# Patient Record
Sex: FEMALE | Race: WHITE
[De-identification: names, ages, dates, MRNs, and addresses within clinical notes are randomized per-mention and may not be internally consistent; named-entity substitution may affect disease eponyms.]

---

## 2017-07-18 ENCOUNTER — HOSPITAL ENCOUNTER (OUTPATIENT)
Dept: HOSPITAL 19 - MC.RAD | Age: 52
End: 2017-07-18
Attending: OBSTETRICS & GYNECOLOGY
Payer: COMMERCIAL

## 2017-07-18 DIAGNOSIS — Z12.31: Primary | ICD-10-CM

## 2018-07-19 ENCOUNTER — HOSPITAL ENCOUNTER (OUTPATIENT)
Dept: HOSPITAL 19 - MC.RAD | Age: 53
End: 2018-07-19
Attending: OBSTETRICS & GYNECOLOGY
Payer: COMMERCIAL

## 2018-07-19 DIAGNOSIS — Z12.31: Primary | ICD-10-CM

## 2019-04-05 ENCOUNTER — HOSPITAL ENCOUNTER (OUTPATIENT)
Dept: HOSPITAL 19 - COL.RAD | Age: 54
End: 2019-04-05
Attending: FAMILY MEDICINE
Payer: COMMERCIAL

## 2019-04-05 DIAGNOSIS — R31.29: Primary | ICD-10-CM

## 2019-04-05 DIAGNOSIS — Z90.49: ICD-10-CM

## 2019-04-05 DIAGNOSIS — R10.9: ICD-10-CM

## 2019-04-05 DIAGNOSIS — Z90.710: ICD-10-CM

## 2019-07-22 ENCOUNTER — HOSPITAL ENCOUNTER (OUTPATIENT)
Dept: HOSPITAL 19 - MC.RAD | Age: 54
End: 2019-07-22
Payer: COMMERCIAL

## 2019-07-22 DIAGNOSIS — Z12.31: Primary | ICD-10-CM

## 2020-07-23 ENCOUNTER — HOSPITAL ENCOUNTER (OUTPATIENT)
Dept: HOSPITAL 19 - MC.RAD | Age: 55
End: 2020-07-23
Attending: OBSTETRICS & GYNECOLOGY
Payer: COMMERCIAL

## 2020-07-23 DIAGNOSIS — Z12.31: Primary | ICD-10-CM

## 2021-07-26 ENCOUNTER — HOSPITAL ENCOUNTER (OUTPATIENT)
Dept: HOSPITAL 19 - MC.RAD | Age: 56
End: 2021-07-26
Attending: FAMILY MEDICINE
Payer: COMMERCIAL

## 2021-07-26 DIAGNOSIS — Z12.31: Primary | ICD-10-CM

## 2022-07-27 ENCOUNTER — HOSPITAL ENCOUNTER (OUTPATIENT)
Dept: HOSPITAL 19 - MC.RAD | Age: 57
End: 2022-07-27
Attending: FAMILY MEDICINE
Payer: COMMERCIAL

## 2022-07-27 DIAGNOSIS — Z12.31: Primary | ICD-10-CM

## 2024-09-03 ENCOUNTER — HOSPITAL ENCOUNTER (OUTPATIENT)
Dept: HOSPITAL 19 - MC.RAD | Age: 59
End: 2024-09-03
Attending: FAMILY MEDICINE
Payer: COMMERCIAL

## 2024-09-03 DIAGNOSIS — Z12.31: Primary | ICD-10-CM

## 2024-10-10 ENCOUNTER — HOSPITAL ENCOUNTER (OUTPATIENT)
Dept: HOSPITAL 19 - COL.CAR | Age: 59
LOS: 1 days | Discharge: HOME | End: 2024-10-11
Attending: INTERNAL MEDICINE
Payer: COMMERCIAL

## 2024-10-10 VITALS — WEIGHT: 147.71 LBS | HEIGHT: 62.01 IN | BODY MASS INDEX: 26.84 KG/M2

## 2024-10-10 VITALS — HEART RATE: 65 BPM | DIASTOLIC BLOOD PRESSURE: 51 MMHG | TEMPERATURE: 97.6 F | SYSTOLIC BLOOD PRESSURE: 115 MMHG

## 2024-10-10 VITALS — SYSTOLIC BLOOD PRESSURE: 112 MMHG | HEART RATE: 79 BPM | DIASTOLIC BLOOD PRESSURE: 75 MMHG | TEMPERATURE: 97.8 F

## 2024-10-10 VITALS — HEART RATE: 71 BPM | SYSTOLIC BLOOD PRESSURE: 109 MMHG | DIASTOLIC BLOOD PRESSURE: 72 MMHG

## 2024-10-10 VITALS — HEART RATE: 62 BPM | SYSTOLIC BLOOD PRESSURE: 119 MMHG | TEMPERATURE: 97.8 F | DIASTOLIC BLOOD PRESSURE: 80 MMHG

## 2024-10-10 VITALS — TEMPERATURE: 97.6 F | SYSTOLIC BLOOD PRESSURE: 106 MMHG | HEART RATE: 71 BPM | DIASTOLIC BLOOD PRESSURE: 72 MMHG

## 2024-10-10 VITALS — SYSTOLIC BLOOD PRESSURE: 109 MMHG

## 2024-10-10 VITALS — DIASTOLIC BLOOD PRESSURE: 75 MMHG | SYSTOLIC BLOOD PRESSURE: 112 MMHG | HEART RATE: 70 BPM | TEMPERATURE: 97.6 F

## 2024-10-10 VITALS — DIASTOLIC BLOOD PRESSURE: 68 MMHG | SYSTOLIC BLOOD PRESSURE: 127 MMHG | TEMPERATURE: 97.5 F | HEART RATE: 74 BPM

## 2024-10-10 VITALS — SYSTOLIC BLOOD PRESSURE: 127 MMHG

## 2024-10-10 VITALS — DIASTOLIC BLOOD PRESSURE: 76 MMHG | SYSTOLIC BLOOD PRESSURE: 112 MMHG | HEART RATE: 78 BPM

## 2024-10-10 VITALS — DIASTOLIC BLOOD PRESSURE: 81 MMHG | HEART RATE: 62 BPM | SYSTOLIC BLOOD PRESSURE: 119 MMHG | TEMPERATURE: 97.8 F

## 2024-10-10 VITALS — TEMPERATURE: 98.5 F | SYSTOLIC BLOOD PRESSURE: 129 MMHG | DIASTOLIC BLOOD PRESSURE: 73 MMHG | HEART RATE: 78 BPM

## 2024-10-10 VITALS — DIASTOLIC BLOOD PRESSURE: 65 MMHG | TEMPERATURE: 97.6 F | SYSTOLIC BLOOD PRESSURE: 111 MMHG | HEART RATE: 71 BPM

## 2024-10-10 VITALS — SYSTOLIC BLOOD PRESSURE: 115 MMHG

## 2024-10-10 DIAGNOSIS — Z63.79: ICD-10-CM

## 2024-10-10 DIAGNOSIS — I49.3: ICD-10-CM

## 2024-10-10 DIAGNOSIS — I45.5: Primary | ICD-10-CM

## 2024-10-10 DIAGNOSIS — R00.2: ICD-10-CM

## 2024-10-10 LAB
ANION GAP SERPL CALC-SCNC: 9 MMOL/L (ref 7–16)
BUN SERPL-MCNC: 14 MG/DL (ref 10–20)
CALCIUM SERPL-MCNC: 9.3 MG/DL (ref 8.4–10.2)
CHLORIDE SERPL-SCNC: 109 MEQ/L (ref 98–107)
CREAT SERPL-SCNC: 0.81 MG/DL (ref 0.57–1.11)
ERYTHROCYTE [DISTWIDTH] IN BLOOD BY AUTOMATED COUNT: 13.1 % (ref 11.5–14.5)
GLUCOSE SERPL-MCNC: 91 MG/DL (ref 70–99)
HCT VFR BLD AUTO: 38.1 % (ref 37–47)
HGB BLD-MCNC: 13 G/DL (ref 12.5–16)
INR BLD: 0.9 (ref 0.8–3)
MCH RBC QN AUTO: 30 PG (ref 27–31)
MCHC RBC AUTO-ENTMCNC: 34 G/DL (ref 33–37)
MCV RBC AUTO: 89 FL (ref 80–100)
PLATELET # BLD AUTO: 333 K/MM3 (ref 130–400)
PMV BLD AUTO: 8.9 FL (ref 7.4–10.4)
POTASSIUM SERPL-SCNC: 4.3 MEQ/L (ref 3.5–4.5)
PROTHROMBIN TIME: 10.3 SECONDS (ref 9.7–12.8)
RBC # BLD AUTO: 4.29 M/MM3 (ref 4.1–5.3)
SODIUM SERPL-SCNC: 140 MEQ/L (ref 136–145)

## 2024-10-10 PROCEDURE — C1785 PMKR, DUAL, RATE-RESP: HCPCS

## 2024-10-10 PROCEDURE — C1894 INTRO/SHEATH, NON-LASER: HCPCS

## 2024-10-10 PROCEDURE — C1898 LEAD, PMKR, OTHER THAN TRANS: HCPCS

## 2024-10-10 NOTE — NUR
Patient assessed around 2115. Alert and oriented, and able to make needs
known. Complained of level 3 pain to pacemaker site. Given PRN Tylenol.
Peripheral INT to left wrist. Denies SOB and dyspnea. LS CTA. HRR. Dressing to
pacemaker site is CDI. Telemetry in place. BSAx4. No edema. Sling to left arm.
Ice pack to pacemaker site. Voices no questions, needs, or concerns at this
time. In bed with call light within reach.

## 2024-10-10 NOTE — NUR
Patient refused Verapmil since she does not take medication at home. States
she would like to talk to Dr. Morales prior to changing home medications.

## 2024-10-10 NOTE — NUR
Patient arrived to the medical unit, alert and oriented x 4, VSS. States some
pressure in the site. Scarce drainage at the bottom of dressing. Swing on left
arm. Post op VS started.

## 2024-10-11 VITALS — SYSTOLIC BLOOD PRESSURE: 142 MMHG | TEMPERATURE: 97.7 F | DIASTOLIC BLOOD PRESSURE: 75 MMHG | HEART RATE: 70 BPM

## 2024-10-11 VITALS — TEMPERATURE: 98.4 F | SYSTOLIC BLOOD PRESSURE: 147 MMHG | HEART RATE: 77 BPM | DIASTOLIC BLOOD PRESSURE: 87 MMHG

## 2024-10-11 VITALS — SYSTOLIC BLOOD PRESSURE: 126 MMHG

## 2024-10-11 VITALS — HEART RATE: 61 BPM | DIASTOLIC BLOOD PRESSURE: 62 MMHG | SYSTOLIC BLOOD PRESSURE: 126 MMHG | TEMPERATURE: 97.9 F

## 2024-10-11 VITALS — SYSTOLIC BLOOD PRESSURE: 147 MMHG

## 2024-10-11 VITALS — SYSTOLIC BLOOD PRESSURE: 142 MMHG

## 2024-10-11 LAB
ANION GAP SERPL CALC-SCNC: 9 MMOL/L (ref 7–16)
BASOPHILS # BLD: 0.1 K/MM3 (ref 0–0.2)
BASOPHILS NFR BLD AUTO: 0.4 % (ref 0–2)
BUN SERPL-MCNC: 13 MG/DL (ref 10–20)
CALCIUM SERPL-MCNC: 8.6 MG/DL (ref 8.4–10.2)
CHLORIDE SERPL-SCNC: 106 MEQ/L (ref 98–107)
CREAT SERPL-SCNC: 0.81 MG/DL (ref 0.57–1.11)
EOSINOPHIL # BLD: 0.2 K/MM3 (ref 0–0.7)
EOSINOPHIL NFR BLD: 1.5 % (ref 0–4)
ERYTHROCYTE [DISTWIDTH] IN BLOOD BY AUTOMATED COUNT: 13.3 % (ref 11.5–14.5)
GLUCOSE SERPL-MCNC: 89 MG/DL (ref 70–99)
GRANULOCYTES # BLD AUTO: 65.5 % (ref 42.2–75.2)
HCT VFR BLD AUTO: 34.3 % (ref 37–47)
HGB BLD-MCNC: 11.7 G/DL (ref 12.5–16)
LYMPHOCYTES # BLD: 3.5 K/MM3 (ref 1.2–3.4)
LYMPHOCYTES NFR BLD: 25.9 % (ref 20–51)
MCH RBC QN AUTO: 31 PG (ref 27–31)
MCHC RBC AUTO-ENTMCNC: 34 G/DL (ref 33–37)
MCV RBC AUTO: 90 FL (ref 80–100)
MONOCYTES # BLD: 0.9 K/MM3 (ref 0.1–0.6)
MONOCYTES NFR BLD AUTO: 6.3 % (ref 1.7–9.3)
NEUTROPHILS # BLD: 8.9 K/MM3 (ref 1.4–6.5)
PLATELET # BLD AUTO: 234 K/MM3 (ref 130–400)
PMV BLD AUTO: 10.4 FL (ref 7.4–10.4)
POTASSIUM SERPL-SCNC: 3.9 MEQ/L (ref 3.5–4.5)
RBC # BLD AUTO: 3.82 M/MM3 (ref 4.1–5.3)
SODIUM SERPL-SCNC: 137 MEQ/L (ref 136–145)

## 2024-10-11 NOTE — NUR
IV CATHETER REMOVED FROM LEFT HAND. CATHETER TIP INTACT, PRESSURE HELD UNTIL
BLEEDING STOPPED. MARCELLO AND TAPE DRESSING PLACED. PATIENT TOLERATED PROCEDURE
WELL.

## 2024-10-11 NOTE — NUR
met with pt to discuss intake information. She reports to live
with her , Cuco [phone] and other family members in [place]. Pt
sees Dr. Garcia for PCP needs and obtains medications from [facility] with no
difficulties. She confirmed to have [employer] insurance. Pt is independent with
[facility] and uses no DME. She has no concerns for SW.
 
Discharge Plan: home

## 2024-10-11 NOTE — NUR
Initial visit; Patient thanked  for looking in on her and saying
hello. She is being discharged today and is glad to know there is Spiritual
Care available at [facility].

## 2024-10-11 NOTE — NUR
PATIENT STATES THAT THEY ARE READY FOR DISCHARGE. PRIMARY NURSE GAVE DICHARGE
INSTRUCTIONS TO PATIENT. DICHARGE EDUCATION GIVEN RELATED TO NEW ANTIBIOTIC,
PACEMAKER INFORMATION, AND IMPORTANCE TO KEEPING SHOULDER IN NEUTRAL POSTION
TO MAINTAIN LEAD PLACEMENT. CURENTLY PATIENT IS GETTING DRESSED WITH THE
ASSISTANCE OF ANOTHER STUDENT NURSE. PATIENT HAS BEEN CALLED AND HE IS
CURRENTLY ON HIS WAY TO PICK PATIENT UP.

## 2024-10-11 NOTE — NUR
Patient is resting in bed, alert and oriented x 4, VSS. Denies any pain, jus
some pressure on the site. Assessment completed. Student nurse to [facility]
morning meds. Pacemaker was download. No further needs at this time. Call
light within reach.

## 2024-10-11 NOTE — NUR
PATIENT IN BED RESTING WITH HEAD OF BED ELEVATED 30 DEGREES. COMPLAINS OF
TENDERNESS TO TOUCH TO PACEMAKER INSERTION SITE, BUT DENIES PAIN AT REST.
DENIES NEED FOR MEDICATION OR INTERVENTION. PATIENT EDUCATED TO INFORM STAFF
IF PAIN INCREASED. RADIOLOGY ARRIVED FOR CXR AT THIS TIME. CALL LIGHT IN REACH
AND BED IN LOWEST POSITION.

## 2024-10-11 NOTE — NUR
Patient has voiced no complaints of pain or discomfort this shift. In bed with
call light within reach.

## 2024-10-11 NOTE — NUR
Patient was provided with discharge information, all questions answered. IV
access and telemetry were discontinued.